# Patient Record
Sex: MALE | Race: ASIAN | NOT HISPANIC OR LATINO | URBAN - METROPOLITAN AREA
[De-identification: names, ages, dates, MRNs, and addresses within clinical notes are randomized per-mention and may not be internally consistent; named-entity substitution may affect disease eponyms.]

---

## 2024-05-18 ENCOUNTER — EMERGENCY (EMERGENCY)
Facility: HOSPITAL | Age: 29
LOS: 1 days | Discharge: HOME | End: 2024-05-18
Attending: EMERGENCY MEDICINE | Admitting: EMERGENCY MEDICINE
Payer: SELF-PAY

## 2024-05-18 VITALS
DIASTOLIC BLOOD PRESSURE: 82 MMHG | TEMPERATURE: 98 F | OXYGEN SATURATION: 97 % | HEART RATE: 72 BPM | RESPIRATION RATE: 16 BRPM | SYSTOLIC BLOOD PRESSURE: 123 MMHG

## 2024-05-18 PROCEDURE — 99284 EMERGENCY DEPT VISIT MOD MDM: CPT

## 2024-05-18 PROCEDURE — 74018 RADEX ABDOMEN 1 VIEW: CPT | Mod: 26

## 2024-05-18 PROCEDURE — 93010 ELECTROCARDIOGRAM REPORT: CPT

## 2024-05-18 PROCEDURE — 71046 X-RAY EXAM CHEST 2 VIEWS: CPT | Mod: 26

## 2024-05-18 PROCEDURE — 70360 X-RAY EXAM OF NECK: CPT | Mod: 26

## 2024-05-18 PROCEDURE — 99053 MED SERV 10PM-8AM 24 HR FAC: CPT

## 2024-05-18 NOTE — ED PROVIDER NOTE - PATIENT PORTAL LINK FT
You can access the FollowMyHealth Patient Portal offered by [facility] by registering at the following website: [email or web address]. By joining [facility]’s FollowMyHealth portal, you will also be able to view your health information using other applications (apps) compatible with our system.

## 2024-05-18 NOTE — ED PROVIDER NOTE - PHYSICAL EXAMINATION
Gen: AAOx3, non-toxic  Head: NCAT  HEENT: EOMI, oral mucosa moist, normal conjunctiva, no foreign body visualized, tonsils w/o erythema or active bleed  Lung: CTAB, no respiratory distress, no wheezes/rhonchi/rales B/L,   CV: RRR, no murmurs, rubs or gallops  Abd: soft, NTND, no guarding, no CVA tenderness  MSK: no visible deformities  Neuro: No focal sensory or motor deficits  Skin: Warm, well perfused, no rash  Psych: normal affect.

## 2024-05-18 NOTE — ED PROVIDER NOTE - NSFOLLOWUPINSTRUCTIONS_ED_ALL_ED_FT
You were seen in the ED for foreign body ingestion.  Your evaluated with an x-ray which showed foreign body in your stomach.  These usually pass on its own without any intervention required.  However you should monitor your symptoms for worsening discomfort, worsening abdominal pain, rectal bleeding, intractable pain.  We will include the number of a gastroenterologist if you choose to follow-up.  If your symptoms worsen, please return to the ED    Swallowed Foreign Body, Adult  Body outline showing the digestive tract, including the stomach and esophagus.  A swallowed foreign body is an object that gets stuck in the digestive tract, either in the part of the body that moves food from the mouth to the stomach (esophagus) or in another part. When a person swallows an object, it passes into the esophagus. The narrowest place in the digestive system is where the esophagus meets the stomach. If the object can pass through that place, it will usually continue through the rest of the digestive system without causing problems. A foreign body that gets stuck may need to be removed.    Foreign bodies may be swallowed by accident or on purpose. It is very important to tell your health care provider what you have swallowed. Some swallowed objects can be life-threatening, and you may need emergency treatment. Dangerous swallowed foreign bodies include:  Objects that get stuck in your throat.  Objects that make you unable to swallow.  Objects that interfere with your breathing.  Sharp objects.  Harmful or poisonous (toxic) objects, such as batteries or illegal drugs.  What are the causes?  The most common swallowed foreign body is food that will not pass through your esophagus to your stomach (food impaction). Foods that commonly become impacted include meats and hard vegetables such as carrots and radishes.    Other common swallowed foreign bodies include:  Pieces of bone from meat or fish.  Toothpicks.  Dentures.  What increases the risk?  You are more likely to have a swallowed foreign body if you:  Wear dentures.  Have been drinking alcohol or taking drugs.  Have a mental health condition.  Have difficulties with thinking and learning (cognitive impairment).  Have a narrowed or scarred area in your digestive tract.  What are the signs or symptoms?  Symptoms of this condition include:  Pain or pressure in your throat or chest.  Not being able to swallow food, liquid, or your saliva.  Drooling.  Choking.  A hoarse voice.  Noisy breathing or trouble breathing.  Vomit that has blood in it.  How is this diagnosed?  This condition may be diagnosed based on your symptoms and medical history. Your health care provider will do a physical exam to confirm the diagnosis and to find the object. A metal detector may be used to find metal objects. Imaging studies may also be done, including:  X-rays.  A CT scan.  Some objects may not be seen on imaging studies. In those cases, an exam or procedure may be done using a scope to look into your esophagus (endoscopy).    How is this treated?  Usually, an object that has passed into your stomach but is not dangerous will pass through your digestive system without treatment. If the swallowed object is not dangerous but is stuck in your esophagus:  Your health care provider may gently suction out the object through your mouth.  You may be given medicine to relax the muscles of your esophagus to allow the object to pass through.  An endoscopy may be done to find and remove the object if it does not pass through with medicine. Your health care provider will put medical instruments through the endoscope to remove the object. You may need emergency treatment if:  The object is in your esophagus and is causing you to inhale saliva into your lungs (aspirate).  The object is in your esophagus and is pressing on your airway. This makes it hard for you to breathe.  The object can damage your digestive tract. Some objects that can cause damage include batteries, magnets, sharp objects, and drugs.  Follow these instructions at home:  Caring for yourself    If the object in your digestive system is expected to pass:  Continue eating what you usually eat unless your health care provider gives you different instructions.  Check your stool after every bowel movement to see if you have passed the object.  If you had an endoscopic procedure to remove the foreign body, follow instructions from your health care provider about caring for yourself after the procedure.  General instructions    Take over-the-counter and prescription medicines only as told by your health care provider.  Keep all follow-up visits, including any for repeat imaging tests. This is important.  How is this prevented?  Cut your food into small pieces.  Always sit upright while eating.  Remove bones from meat and fish.  Chew your food well before swallowing.  Do not talk, laugh, or walk around while eating or swallowing.  Contact a health care provider if:  You continue to have symptoms of a swallowed foreign body.  The object has not passed out of your body after 3 days.  Get help right away if:  You have a fever.  You have pain in your chest or your abdomen.  You cough up blood.  You have blood in your stool (feces).  You have blood in your vomit after treatment.  These symptoms may be an emergency. Get help right away. Call 911.  Do not wait to see if the symptoms will go away.  Do not drive yourself to the hospital.  Summary  A swallowed foreign body is an object that gets stuck in the digestive tract, either in the esophagus or in another part.  Usually, an object that has passed into your stomach but is not dangerous will pass through your digestive system without treatment.  Endoscopy may be done to find and remove the object.  If the object in your digestive system is expected to pass, contact your health care provider if the object has not passed after 3 days.  Get help right away if you have blood in your stool or there is blood when you cough or vomit.  This information is not intended to replace advice given to you by your health care provider. Make sure you discuss any questions you have with your health care provider.

## 2024-05-18 NOTE — ED ADULT TRIAGE NOTE - CHIEF COMPLAINT QUOTE
Pt c/o midsternal chest discomfort. States he accidentally swallowed a piece of a soda can tab (not the whole thing) and feels it's on his chest. Pt able to speak in complete sentences, no difficulty breathing. Denies hx

## 2024-05-18 NOTE — ED ADULT NURSE NOTE - OBJECTIVE STATEMENT
received pt spot15. A&OX4 RR even unlabored completing full clear sentences. Pt presents c/o midsternal chest discomfort. pt states he accidentally swallowed a piece of a soda can tab (not the whole thing) and feels it's on his chest. Pt able to speak in complete sentences, no difficulty breathing. no drooling noted. pt able to tolerate own secretion. Denies past medical hx. pt well appearing on assessment. stretcher lowest position, siderail up, safety measures maintained throughout care. pt awaiting XR at this time

## 2024-05-18 NOTE — ED PROVIDER NOTE - CLINICAL SUMMARY MEDICAL DECISION MAKING FREE TEXT BOX
28-year-old male no past medical history presents ED after foreign body ingestion.  States 2 hours ago ingested soda can tab now complaining of constant midsternal chest discomfort.  States soda can tab became loose after movement, fell into drink, and then ingested the soda can tab.  Attempted to fall asleep however persistent chest discomfort with this open associated dysphagia and pleuritic chest discomfort.  Denies fevers chills nausea vomiting diarrhea abdominal pain dysuria hematuria.    VSS. Clinically stable. EKG wnl w no ST elevations or T wave inversions. PE, well appearing, no acute distress, AAOx3. NCAT, EOMI, normal conjunctiva, mucous membranes moist, no foreign body visualized, tonsils w/o erythema or active bleed LCTAB no w/r/c, no MRG, RRR, abd NDNT, no rebound tenderness or guarding, no CVA ttp, no focal neuro deficits, neurovascularly intact, dp 2+, no bruising, rashes, or erythema. Suspicion for foreign body ingestion. will assess w xr to assess location. dispo pending imaging

## 2024-05-18 NOTE — ED PROVIDER NOTE - ATTENDING CONTRIBUTION TO CARE
29 y/o otherwise healthy M p/w throat and chest discomfort after accidentally swallowing the tab of a soda can.  Pt states the tab had come off and fell into his drink and then he accidentally drank it.  He is now c/o throat and chest discomfort, states it feels like it's stuck.  No difficulty breathing, vomiting, voice changes, abd pain.    Well appearing, lying comfortably in stretcher, awake and alert, nontoxic.  AF/VSS.  Pt is speaking in full sentences, no increased work of breathing, no stridor or voice muffling.  Abd soft ntnd.  No crepitus.    Will obtain xr imaging to eval for location of fb, reassess.

## 2024-05-18 NOTE — ED PROVIDER NOTE - NSFOLLOWUPCLINICS_GEN_ALL_ED_FT
Gastroenterology at [facility]  Gastroenterology  [address] [place] [address]  Phone: [phone]  Fax:     [facility]  Gastroenterology  [address] [place] [id number]  Phone: [phone]  Fax:

## 2024-05-18 NOTE — ED PROVIDER NOTE - CARE PROVIDER_API CALL
Jose Elias Ortiz  Gastroenterology  [address], [address]  [place] [address]  Phone: [phone]  Fax: [phone]  Follow Up Time:

## 2024-05-18 NOTE — ED PROVIDER NOTE - OBJECTIVE STATEMENT
28-year-old male no past medical history presents ED after foreign body ingestion.  States 2 hours ago ingested soda can tab now complaining of constant midsternal chest discomfort.  States soda can tab became loose after movement, fell into drink, and then ingested the soda can tab.  Attempted to fall asleep however persistent chest discomfort with this open associated dysphagia and pleuritic chest discomfort.  Denies fevers chills nausea vomiting diarrhea abdominal pain dysuria hematuria.